# Patient Record
Sex: MALE | Race: WHITE
[De-identification: names, ages, dates, MRNs, and addresses within clinical notes are randomized per-mention and may not be internally consistent; named-entity substitution may affect disease eponyms.]

---

## 2017-03-24 ENCOUNTER — HOSPITAL ENCOUNTER (INPATIENT)
Dept: HOSPITAL 74 - YASAS | Age: 37
LOS: 4 days | Discharge: HOME | End: 2017-03-28
Attending: INTERNAL MEDICINE | Admitting: INTERNAL MEDICINE
Payer: COMMERCIAL

## 2017-03-24 VITALS — BODY MASS INDEX: 21.6 KG/M2

## 2017-03-24 DIAGNOSIS — F10.24: ICD-10-CM

## 2017-03-24 DIAGNOSIS — F10.230: Primary | ICD-10-CM

## 2017-03-24 DIAGNOSIS — M21.371: ICD-10-CM

## 2017-03-24 DIAGNOSIS — Z98.890: ICD-10-CM

## 2017-03-24 DIAGNOSIS — R63.4: ICD-10-CM

## 2017-03-24 DIAGNOSIS — F17.213: ICD-10-CM

## 2017-03-24 DIAGNOSIS — F10.282: ICD-10-CM

## 2017-03-24 LAB
APPEARANCE UR: CLEAR
BILIRUB UR STRIP.AUTO-MCNC: 2 MG/DL
COLOR UR: (no result)
KETONES UR QL STRIP: NEGATIVE
LEUKOCYTE ESTERASE UR QL STRIP.AUTO: NEGATIVE
MUCOUS THREADS URNS QL MICRO: (no result)
NITRITE UR QL STRIP: NEGATIVE
PH UR: 8 [PH] (ref 5–8)
PROT UR QL STRIP: (no result)
PROT UR QL STRIP: NEGATIVE
RBC # BLD AUTO: 4 /HPF (ref 0–3)
RBC # UR STRIP: NEGATIVE /UL
SP GR UR: 1.02 (ref 1–1.03)
UROBILINOGEN UR STRIP-MCNC: (no result) E.U./DL (ref 0.2–1)
WBC # UR AUTO: 1 /HPF (ref 3–5)

## 2017-03-24 RX ADMIN — Medication SCH MG: at 22:24
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
## 2017-03-25 LAB
ALBUMIN SERPL-MCNC: 3.9 G/DL (ref 3.4–5)
ALP SERPL-CCNC: 110 U/L (ref 45–117)
ALT SERPL-CCNC: 28 U/L (ref 12–78)
ANION GAP SERPL CALC-SCNC: 10 MMOL/L (ref 8–16)
AST SERPL-CCNC: 54 U/L (ref 15–37)
BILIRUB SERPL-MCNC: 1.3 MG/DL (ref 0.2–1)
CALCIUM SERPL-MCNC: 8.8 MG/DL (ref 8.5–10.1)
CO2 SERPL-SCNC: 30 MMOL/L (ref 21–32)
CREAT SERPL-MCNC: 0.7 MG/DL (ref 0.7–1.3)
DEPRECATED RDW RBC AUTO: 13.4 % (ref 11.9–15.9)
GLUCOSE SERPL-MCNC: 78 MG/DL (ref 74–106)
MCH RBC QN AUTO: 33.2 PG (ref 25.7–33.7)
MCHC RBC AUTO-ENTMCNC: 34.4 G/DL (ref 32–35.9)
MCV RBC: 96.7 FL (ref 80–96)
PLATELET # BLD AUTO: 207 K/MM3 (ref 134–434)
PMV BLD: 8.1 FL (ref 7.5–11.1)
PROT SERPL-MCNC: 7.2 G/DL (ref 6.4–8.2)
WBC # BLD AUTO: 5.6 K/MM3 (ref 4–10)

## 2017-03-25 RX ADMIN — Medication SCH MG: at 22:13

## 2017-03-25 RX ADMIN — Medication SCH TAB: at 10:17

## 2017-03-25 RX ADMIN — NICOTINE SCH: 14 PATCH, EXTENDED RELEASE TRANSDERMAL at 10:18

## 2017-03-25 NOTE — EKG
Test Reason : 

Blood Pressure : ***/*** mmHG

Vent. Rate : 110 BPM     Atrial Rate : 110 BPM

   P-R Int : 192 ms          QRS Dur : 068 ms

    QT Int : 332 ms       P-R-T Axes : 028 070 070 degrees

   QTc Int : 449 ms

 

SINUS TACHYCARDIA

OTHERWISE NORMAL ECG

NO PREVIOUS ECGS AVAILABLE

Confirmed by MELVA ROWE MD ([id number]) on 3/25/2017 6:28:14 PM

 

Referred By:             Confirmed By:MELVA ROWE MD

## 2017-03-25 NOTE — PN
S CIWA





- CIWA Score


Nausea/Vomiting: 3


Muscle Tremors: 3


Anxiety: 3


Agitation: 3


Paroxysmal Sweats: 1-Minimal Palms Moist


Orientation: 0-Oriented


Tacttile Disturbances: 1-Very Mild Itch/Numbness


Auditory Disturbances: 1-Very Mild


Visual Disturbances: 1-Very Mild Sensitivity


Headache: 2-Mild


CIWA-Ar Total Score: 18





[facility] Progress Note (SOAP)


Subjective: 


ALERT,IRRITABLE,ANXIOUS,INTERRUPTED SLEEP,TREMOR


Objective: 


03/25/17 09:52


 Vital Signs











Temperature  97.1 F L  03/25/17 09:46


 


Pulse Rate  103 H  03/25/17 09:46


 


Respiratory Rate  18   03/25/17 09:46


 


Blood Pressure  141/83   03/25/17 09:46


 


O2 Sat by Pulse Oximetry (%)      














03/25/17 09:52


EKG SINUS TACHYCARDIA 110/MIN


 Laboratory Last Values











Urine Color  Johanne   03/24/17  21:09    


 


Urine Appearance  Clear   03/24/17  21:09    


 


Urine pH  8.0  (5.0-8.0)  D 03/24/17  21:09    


 


Ur Specific Gravity  1.023  (1.001-1.035)   03/24/17  21:09    


 


Urine Protein  2+  (NEGATIVE)  H  03/24/17  21:09    


 


Urine Glucose (UA)  Negative  (NEGATIVE)   03/24/17  21:09    


 


Urine Ketones  Negative  (NEGATIVE)   03/24/17  21:09    


 


Urine Blood  Negative  (NEGATIVE)   03/24/17  21:09    


 


Urine Nitrite  Negative  (NEGATIVE)   03/24/17  21:09    


 


Urine Bilirubin  2.0  (NEGATIVE)   03/24/17  21:09    


 


Urine Urobilinogen  4.0 e.u/dl E.U./dl (0.2-1.0)   03/24/17  21:09    


 


Ur Leukocyte Esterase  Negative  (NEGATIVE)   03/24/17  21:09    


 


Urine RBC  4 /hpf (0-3)   03/24/17  21:09    


 


Urine WBC  1 /hpf (3-5)   03/24/17  21:09    


 


Ur Epithelial Cells  Rare /hpf (FEW)   03/24/17  21:09    


 


Urine Mucus  Rare   03/24/17  21:09    














03/25/17 09:53


LABS PENDING


Assessment: 


03/25/17 09:53








03/25/17 09:53


WITHDRAWAL SYMPTOM


Plan: 


CONTINUE DETOX

## 2017-03-26 RX ADMIN — NICOTINE SCH: 14 PATCH, EXTENDED RELEASE TRANSDERMAL at 11:11

## 2017-03-26 RX ADMIN — Medication SCH TAB: at 11:10

## 2017-03-26 RX ADMIN — Medication SCH MG: at 22:06

## 2017-03-26 NOTE — PN
S CIWA





- CIWA Score


Nausea/Vomiting: 3


Muscle Tremors: 4-Moderate,w/Arms Extend


Anxiety: 4-Mod. Anxious/Guarded


Agitation: 2


Paroxysmal Sweats: No Perspiration


Orientation: 0-Oriented


Tacttile Disturbances: 1-Very Mild Itch/Numbness


Auditory Disturbances: 0-None


Visual Disturbances: 0-None


Headache: 2-Mild


CIWA-Ar Total Score: 16





[facility] Progress Note (SOAP)


Subjective: 


Anxious, tremor, sweating, interrupted sleep, nausea


Objective: 





03/26/17 13:15


 Last Vital Signs











Temp Pulse Resp BP Pulse Ox


 


 97.7 F   94 H  16   123/82    


 


 03/26/17 10:00  03/26/17 10:00  03/26/17 10:00  03/26/17 10:00   








 Laboratory Tests











  03/24/17 03/25/17 03/25/17





  21:09 08:00 08:00


 


WBC   5.6 


 


RBC   4.32 


 


Hgb   14.4  D 


 


Hct   41.8 


 


MCV   96.7 H 


 


MCHC   34.4 


 


RDW   13.4 


 


Plt Count   207  D 


 


MPV   8.1 


 


Sodium    140


 


Potassium    3.5


 


Chloride    100


 


Carbon Dioxide    30


 


Anion Gap    10


 


BUN    5 L


 


Creatinine    0.7


 


Creat Clearance w eGFR    > 60


 


Random Glucose    78


 


Calcium    8.8


 


Total Bilirubin    1.3 H D


 


AST    54 H D


 


ALT    28


 


Alkaline Phosphatase    110  D


 


Total Protein    7.2


 


Albumin    3.9


 


Urine Color  Johanne  


 


Urine Appearance  Clear  


 


Urine pH  8.0  D  


 


Ur Specific Gravity  1.023  


 


Urine Protein  2+ H  


 


Urine Glucose (UA)  Negative  


 


Urine Ketones  Negative  


 


Urine Blood  Negative  


 


Urine Nitrite  Negative  


 


Urine Bilirubin  2.0  


 


Urine Urobilinogen  4.0 e.u/dl  


 


Ur Leukocyte Esterase  Negative  


 


Urine RBC  4  


 


Urine WBC  1  


 


Ur Epithelial Cells  Rare  


 


Urine Mucus  Rare  


 


RPR Titer   














  03/25/17





  08:00


 


WBC 


 


RBC 


 


Hgb 


 


Hct 


 


MCV 


 


MCHC 


 


RDW 


 


Plt Count 


 


MPV 


 


Sodium 


 


Potassium 


 


Chloride 


 


Carbon Dioxide 


 


Anion Gap 


 


BUN 


 


Creatinine 


 


Creat Clearance w eGFR 


 


Random Glucose 


 


Calcium 


 


Total Bilirubin 


 


AST 


 


ALT 


 


Alkaline Phosphatase 


 


Total Protein 


 


Albumin 


 


Urine Color 


 


Urine Appearance 


 


Urine pH 


 


Ur Specific Gravity 


 


Urine Protein 


 


Urine Glucose (UA) 


 


Urine Ketones 


 


Urine Blood 


 


Urine Nitrite 


 


Urine Bilirubin 


 


Urine Urobilinogen 


 


Ur Leukocyte Esterase 


 


Urine RBC 


 


Urine WBC 


 


Ur Epithelial Cells 


 


Urine Mucus 


 


RPR Titer  Nonreactive








Labs noted: UA with 2+ protein and RBC of 4


Assessment: 





03/26/17 13:17


Withdrawal symptoms





Noted with proteinuria and microscopic hematuria


Plan: 


Continue detox





Proteinuria and microscopic hematuria: encouraged to drink lots of water, 

repeat UA

## 2017-03-27 LAB
APPEARANCE UR: (no result)
BACTERIA #/AREA URNS HPF: (no result) /HPF
BILIRUB UR STRIP.AUTO-MCNC: NEGATIVE MG/DL
CAOX CRY URNS QL MICRO: (no result) /HPF
COLOR UR: YELLOW
KETONES UR QL STRIP: NEGATIVE
LEUKOCYTE ESTERASE UR QL STRIP.AUTO: NEGATIVE
MUCOUS THREADS URNS QL MICRO: (no result)
NITRITE UR QL STRIP: NEGATIVE
PH UR: 8 [PH] (ref 5–8)
PROT UR QL STRIP: (no result)
PROT UR QL STRIP: NEGATIVE
RBC # BLD AUTO: (no result) /HPF (ref 0–3)
RBC # UR STRIP: NEGATIVE /UL
SP GR UR: 1.03 (ref 1–1.03)
UROBILINOGEN UR STRIP-MCNC: (no result) E.U./DL (ref 0.2–1)
WBC # UR AUTO: 6 /HPF (ref 3–5)

## 2017-03-27 RX ADMIN — NICOTINE SCH: 14 PATCH, EXTENDED RELEASE TRANSDERMAL at 10:58

## 2017-03-27 RX ADMIN — Medication SCH TAB: at 10:58

## 2017-03-27 RX ADMIN — Medication SCH MG: at 22:45

## 2017-03-27 NOTE — PN
[facility] Progress Note (SOAP)


Subjective: 


interrupted sleep, rt drop foot with splint need transportation 


Objective: 


03/27/17 10:31


 Vital Signs











Temperature  98.2 F   03/27/17 06:00


 


Pulse Rate  78   03/27/17 06:00


 


Respiratory Rate  16   03/27/17 06:00


 


Blood Pressure  106/67   03/27/17 06:00


 


O2 Sat by Pulse Oximetry (%)      








 Laboratory Tests











  03/24/17 03/25/17 03/25/17





  21:09 08:00 08:00


 


WBC   5.6 


 


RBC   4.32 


 


Hgb   14.4  D 


 


Hct   41.8 


 


MCV   96.7 H 


 


MCHC   34.4 


 


RDW   13.4 


 


Plt Count   207  D 


 


MPV   8.1 


 


Sodium    140


 


Potassium    3.5


 


Chloride    100


 


Carbon Dioxide    30


 


Anion Gap    10


 


BUN    5 L


 


Creatinine    0.7


 


Creat Clearance w eGFR    > 60


 


Random Glucose    78


 


Calcium    8.8


 


Total Bilirubin    1.3 H D


 


AST    54 H D


 


ALT    28


 


Alkaline Phosphatase    110  D


 


Total Protein    7.2


 


Albumin    3.9


 


Urine Color  Johanne  


 


Urine Appearance  Clear  


 


Urine pH  8.0  D  


 


Ur Specific Gravity  1.023  


 


Urine Protein  2+ H  


 


Urine Glucose (UA)  Negative  


 


Urine Ketones  Negative  


 


Urine Blood  Negative  


 


Urine Nitrite  Negative  


 


Urine Bilirubin  2.0  


 


Urine Urobilinogen  4.0 e.u/dl  


 


Ur Leukocyte Esterase  Negative  


 


Urine RBC  4  


 


Urine WBC  1  


 


Ur Epithelial Cells  Rare  


 


Urine Mucus  Rare  


 


RPR Titer   














  03/25/17





  08:00


 


WBC 


 


RBC 


 


Hgb 


 


Hct 


 


MCV 


 


MCHC 


 


RDW 


 


Plt Count 


 


MPV 


 


Sodium 


 


Potassium 


 


Chloride 


 


Carbon Dioxide 


 


Anion Gap 


 


BUN 


 


Creatinine 


 


Creat Clearance w eGFR 


 


Random Glucose 


 


Calcium 


 


Total Bilirubin 


 


AST 


 


ALT 


 


Alkaline Phosphatase 


 


Total Protein 


 


Albumin 


 


Urine Color 


 


Urine Appearance 


 


Urine pH 


 


Ur Specific Gravity 


 


Urine Protein 


 


Urine Glucose (UA) 


 


Urine Ketones 


 


Urine Blood 


 


Urine Nitrite 


 


Urine Bilirubin 


 


Urine Urobilinogen 


 


Ur Leukocyte Esterase 


 


Urine RBC 


 


Urine WBC 


 


Ur Epithelial Cells 


 


Urine Mucus 


 


RPR Titer  Nonreactive








pt





03/27/17 11:19


pt aox3 in nad ambulates with limb on rt 


Assessment: 


03/27/17 10:31


withdrawal sx's 





03/27/17 11:19


rt drop foot


Plan: 


cont. detox 


increase fluids 


d/c in am

## 2017-03-28 VITALS — TEMPERATURE: 97.7 F | DIASTOLIC BLOOD PRESSURE: 84 MMHG | HEART RATE: 78 BPM | SYSTOLIC BLOOD PRESSURE: 122 MMHG

## 2017-03-28 PROCEDURE — HZ2ZZZZ DETOXIFICATION SERVICES FOR SUBSTANCE ABUSE TREATMENT: ICD-10-PCS | Performed by: INTERNAL MEDICINE

## 2017-03-28 NOTE — DS
[facility] Detox Discharge Summary


Admission Date: 


03/24/17





Discharge Date: 03/28/17





- History


Present History: Alcohol Dependence





- Physical Exam Results


Vital Signs: 


 Vital Signs











Temperature  97.5 F L  03/28/17 05:59


 


Pulse Rate  73   03/28/17 05:59


 


Respiratory Rate  18   03/28/17 05:59


 


Blood Pressure  93/67   03/28/17 05:59


 


O2 Sat by Pulse Oximetry (%)      














- Treatment


Hospital Course: Detox Protocol Followed, Detoxed Safely, Responded well, 

Discharged Condition Good, Rehab Referral Accepted





- Medication


Discharge Medications: 


Ambulatory Orders





NK [No Known Home Medication]  03/14/16 











- Diagnosis


(1) Alcohol dependence with uncomplicated withdrawal


Current Visit: Yes   Status: Chronic





(2) Nicotine dependence


Current Visit: Yes   Status: Chronic   Qualifiers: 


     Nicotine product type: cigarettes     Substance use status: in withdrawal 

       Qualified Code(s): F17.213 - Nicotine dependence, cigarettes, with 

withdrawal  





(3) Weight loss


Current Visit: Yes   Status: Acute





(4) Status post lumbar spine operation


Current Visit: Yes   Status: Resolved





(5) Alcohol-induced anxiety disorder


Current Visit: No   Status: Acute





(6) Alcohol-induced sleep disorder


Current Visit: No   Status: Acute





(7) Spinal cord injury


Current Visit: No   Status: Chronic








- AMA


Did Patient Leave Against Medical Advice: No

## 2023-01-27 ENCOUNTER — HOSPITAL ENCOUNTER (INPATIENT)
Dept: HOSPITAL 74 - YASAS | Age: 43
LOS: 4 days | Discharge: HOME | End: 2023-01-31
Attending: FAMILY MEDICINE | Admitting: ALLERGY & IMMUNOLOGY
Payer: COMMERCIAL

## 2023-01-27 VITALS — BODY MASS INDEX: 21.4 KG/M2

## 2023-01-27 DIAGNOSIS — F10.230: Primary | ICD-10-CM

## 2023-01-27 DIAGNOSIS — M21.371: ICD-10-CM

## 2023-01-27 DIAGNOSIS — F17.210: ICD-10-CM

## 2023-01-27 DIAGNOSIS — Z99.89: ICD-10-CM

## 2023-01-27 PROCEDURE — U0005 INFEC AGEN DETEC AMPLI PROBE: HCPCS

## 2023-01-27 PROCEDURE — U0003 INFECTIOUS AGENT DETECTION BY NUCLEIC ACID (DNA OR RNA); SEVERE ACUTE RESPIRATORY SYNDROME CORONAVIRUS 2 (SARS-COV-2) (CORONAVIRUS DISEASE [COVID-19]), AMPLIFIED PROBE TECHNIQUE, MAKING USE OF HIGH THROUGHPUT TECHNOLOGIES AS DESCRIBED BY CMS-2020-01-R: HCPCS

## 2023-01-27 PROCEDURE — HZ2ZZZZ DETOXIFICATION SERVICES FOR SUBSTANCE ABUSE TREATMENT: ICD-10-PCS | Performed by: FAMILY MEDICINE

## 2023-01-27 RX ADMIN — Medication SCH MG: at 23:22

## 2023-01-28 LAB
ALBUMIN SERPL-MCNC: 3.6 G/DL (ref 3.4–5)
ALP SERPL-CCNC: 73 U/L (ref 45–117)
ALT SERPL-CCNC: 23 U/L (ref 13–61)
ANION GAP SERPL CALC-SCNC: 7 MMOL/L (ref 8–16)
AST SERPL-CCNC: 30 U/L (ref 15–37)
BILIRUB SERPL-MCNC: 0.3 MG/DL (ref 0.2–1)
BUN SERPL-MCNC: 7.4 MG/DL (ref 7–18)
CALCIUM SERPL-MCNC: 8.9 MG/DL (ref 8.5–10.1)
CHLORIDE SERPL-SCNC: 104 MMOL/L (ref 98–107)
CO2 SERPL-SCNC: 31 MMOL/L (ref 21–32)
CREAT SERPL-MCNC: 0.7 MG/DL (ref 0.55–1.3)
DEPRECATED RDW RBC AUTO: 13.3 % (ref 11.9–15.9)
GLUCOSE SERPL-MCNC: 77 MG/DL (ref 74–106)
HCT VFR BLD CALC: 32.8 % (ref 35.4–49)
HGB BLD-MCNC: 10.9 GM/DL (ref 11.7–16.9)
MCH RBC QN AUTO: 33.9 PG (ref 25.7–33.7)
MCHC RBC AUTO-ENTMCNC: 33.3 G/DL (ref 32–35.9)
MCV RBC: 101.6 FL (ref 80–96)
PLATELET # BLD AUTO: 367 10^3/UL (ref 134–434)
PMV BLD: 7.1 FL (ref 7.5–11.1)
PROT SERPL-MCNC: 6.6 G/DL (ref 6.4–8.2)
RBC # BLD AUTO: 3.23 M/MM3 (ref 4–5.6)
SODIUM SERPL-SCNC: 143 MMOL/L (ref 136–145)
WBC # BLD AUTO: 3.9 K/MM3 (ref 4–10)

## 2023-01-28 RX ADMIN — Medication SCH TAB: at 10:14

## 2023-01-28 RX ADMIN — METHOCARBAMOL PRN MG: 500 TABLET ORAL at 22:05

## 2023-01-28 RX ADMIN — NICOTINE SCH: 14 PATCH, EXTENDED RELEASE TRANSDERMAL at 10:14

## 2023-01-28 RX ADMIN — Medication SCH MG: at 22:05

## 2023-01-29 RX ADMIN — Medication SCH MG: at 22:20

## 2023-01-29 RX ADMIN — NICOTINE SCH: 14 PATCH, EXTENDED RELEASE TRANSDERMAL at 10:31

## 2023-01-29 RX ADMIN — Medication SCH TAB: at 10:31

## 2023-01-29 RX ADMIN — METHOCARBAMOL PRN MG: 500 TABLET ORAL at 22:20

## 2023-01-30 VITALS — RESPIRATION RATE: 18 BRPM

## 2023-01-30 RX ADMIN — Medication SCH MG: at 22:38

## 2023-01-30 RX ADMIN — NICOTINE SCH: 14 PATCH, EXTENDED RELEASE TRANSDERMAL at 10:16

## 2023-01-30 RX ADMIN — Medication SCH MG: at 22:37

## 2023-01-30 RX ADMIN — METHOCARBAMOL PRN MG: 500 TABLET ORAL at 22:38

## 2023-01-30 RX ADMIN — Medication SCH TAB: at 10:16

## 2023-01-31 VITALS — DIASTOLIC BLOOD PRESSURE: 77 MMHG | SYSTOLIC BLOOD PRESSURE: 122 MMHG | HEART RATE: 98 BPM | TEMPERATURE: 96.7 F
